# Patient Record
Sex: MALE | Race: WHITE | Employment: FULL TIME | URBAN - NONMETROPOLITAN AREA
[De-identification: names, ages, dates, MRNs, and addresses within clinical notes are randomized per-mention and may not be internally consistent; named-entity substitution may affect disease eponyms.]

---

## 2022-07-07 ENCOUNTER — APPOINTMENT (OUTPATIENT)
Dept: PHYSICAL THERAPY | Age: 52
End: 2022-07-07
Payer: COMMERCIAL

## 2022-07-11 ENCOUNTER — HOSPITAL ENCOUNTER (OUTPATIENT)
Dept: PHYSICAL THERAPY | Age: 52
Discharge: HOME | End: 2022-07-11
Payer: COMMERCIAL

## 2022-07-11 PROCEDURE — 97161 PT EVAL LOW COMPLEX 20 MIN: CPT

## 2022-07-11 NOTE — THERAPY EVALUATION
[address] AND [facility], [address]  Ph: [phone]    Fax: [phone]    Plan of Care/Statement of Necessity for Physical Therapy Services  2-15    Patient name: Natalie Rogers  : 1970  Provider#: [id number]  [facility], Not On File, MD      Medical/Treatment Diagnosis: Chronic low back pain with sciatica, sciatica laterality unspecified, unspecified back pain laterality [M54.40, G89.29]     Prior Hospitalization: see medical history     Comorbidities: see medical history  Prior Level of Function: Independent with all ADL's; no use of AD  Medications: Verified on Patient Summary List    Start of Care: 2022      Onset Date: 2022       The Plan of Care and following information is based on the information from the initial evaluation. Assessment/ key information:   Patient presents to physical therapy with complaints of lower back pain that radiates into right LE, decreased ROM, right le numbness/tingling down into lateral and plantar aspect of right foot, and lower extremity weakness limiting functional activities. The patient would benefit from physical therapy to utilize modalities to decrease pain, increase ROM, strength, and core stability to maximize function. Pt will receive treatment including lumbopelvic and lower extremity flexibility, ROM, strengthening, pelvic and core stabilization, functional activities, and education in posture and body mechanics. Patient's symptoms are consistent with likely nerve compression in piriformis region at the sciatic nerve. Objective measurements rule out likelihood of nerve compression in lumbar region. DPT to monitor pt's progress with conservative physical therapy treatment, and may refer to MD for further MRI diagnostic testing if symptoms do not improve. Pt with scheduled appt with neurologist 2022.   Patient will be instructed in HEP with 1:1 supervision and given pictures to facilitate compliance. Patient would benefit from skilled physical therapy to progress toward goals and maximize function. This plan of care was discussed with the physical therapist assistant. Thank you for this referral.       Evaluation Complexity History LOW Complexity : Zero comorbidities / personal factors that will impact the outcome / POC; Examination LOW Complexity : 1-2 Standardized tests and measures addressing body structure, function, activity limitation and / or participation in recreation  ;Presentation LOW Complexity : Stable, uncomplicated  ;Clinical Decision Making   Overall Complexity Rating: LOW     Problem List: pain affecting function, decrease ROM, decrease strength, impaired gait/ balance, decrease ADL/ functional abilitiies and decrease activity tolerance   Treatment Plan may include any combination of the following: Therapeutic exercise, Therapeutic activities, Neuromuscular re-education, Physical agent/modality, Gait/balance training, Manual therapy, Patient education, Self Care training, Functional mobility training, Home safety training and Stair training  Patient / Family readiness to learn indicated by: asking questions, trying to perform skills and interest  Persons(s) to be included in education: patient (P)  Barriers to Learning/Limitations: None  Patient Goal (s): 100% back to work  Patient Self Reported Health Status: good  Rehabilitation Potential: good    Short Term Goals: To be accomplished in 5 treatments. 1. Patient will demonstrate independence and compliance with HEP in order to assist with carryover from PT services. 2.   Patient will be able to yvon/doff socks/shoes without pain greater than or equal to 2/10 to increase functional mobility. 3.   Patient will be able to walk around at work without pain greater than or equal to 2/10 to increase functional mobility.   4.   Patient will be able to sleep at night without pain greater than or equal to 2/10 to increase functional mobility. 5.   Patient will increase right knee extension PROM to -15 in order to ambulate with more normalized gait pattern. Long Term Goals: To be accomplished in 10  treatments. 1.   Patient will be able to sleep at night without pain greater than or equal to 1/10 to increase functional mobility. 2.   Patient will increase right knee extension PROM to -10 in order to ambulate with more normalized gait pattern. 3.   Patient will report centralization of numbness/tingling down left le, to decrease fear of falling during gait. Frequency / Duration: Patient to be seen 2 times per week for 10 treatments. Patient/ Caregiver education and instruction: exercises    [x]  Plan of care has been reviewed with KELECHI Oswald, PT , DPT     2022     ________________________________________________________________________    I certify that the above Therapy Services are being furnished while the patient is under my care. I agree with the treatment plan and certify that this therapy is necessary.     Physician's Signature:____________________  Date:____________Time: _________    Patient name: Sharan Serrano  : 1970  Provider#: [id number]
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
## 2022-07-13 ENCOUNTER — HOSPITAL ENCOUNTER (OUTPATIENT)
Dept: PHYSICAL THERAPY | Age: 52
Discharge: HOME | End: 2022-07-13
Payer: COMMERCIAL

## 2022-07-13 PROCEDURE — 97014 ELECTRIC STIMULATION THERAPY: CPT

## 2022-07-13 PROCEDURE — 97110 THERAPEUTIC EXERCISES: CPT

## 2022-07-13 NOTE — PROGRESS NOTES
PT DAILY TREATMENT NOTE 2-15    Patient Name: Wilda Frankel  Date:2022  : 1970  [x]  Patient  Verified  Payor: Arslan Alicea / Plan: Esther Kumar PPO / Product Type: PPO /    In time: 8:25  Out time: 9:30  Total Treatment Time (min): 65  Visit #:  2    Treatment Area: Chronic low back pain with sciatica, sciatica laterality unspecified, unspecified back pain laterality [M54.40, G89.29]    SUBJECTIVE  Pain Level (0-10 scale): 5/10  Any medication changes, allergies to medications, adverse drug reactions, diagnosis change, or new procedure performed?: [x] No    [] Yes (see summary sheet for update)  Subjective functional status/changes:     Pt states the numbness in his leg is getting better.     OBJECTIVE    Modality rationale: decrease pain and increase tissue extensibility to improve the patients ability to be able to perform AROM   Min Type Additional Details      10 [x] Estim: []Att   [x]Unatt    []TENS instruct                  [x]IFC  []Premod   []NMES                    []Other:  []w/US      [x]w/ heat  []w/ ice  Position: seated  Location: R piriformis       []  Traction: [] Cervical       []Lumbar                       [] Prone          []Supine                       []Intermittent   []Continuous Lbs:  [] before manual  [] after manual  [] w/ heat  [] Simultaneously performed with w/ Estim    []  Ultrasound: []Continuous   [] Pulsed                       at: []1MHz   []3MHz Location:  W/cm2:    [] Paraffin         Location:   []w/heat    []  Ice     []  Heat  []  Ice massage Position:  Location:    []  Laser  []  Other: Position:  Location:      []  Vasopneumatic Device Pressure:       [] lo [] med [] hi   [] w/ ice      Temperature:   [] Simultaneously performed with w/ Estim     [x] Skin assessment post-treatment:  [x]intact [x]redness- no adverse reaction    []redness - adverse reaction:       55 min Therapeutic Exercise:  [x] See flow sheet :   Rationale: increase ROM and increase strength to improve the patients ability to improve functional mobility         With   [x] TE   [] TA   [] neuro   [] other: Patient Education: [x] Review HEP    [] Progressed/Changed HEP based on:   [] positioning   [] body mechanics   [] transfers   [] heat/ice application    [] other:      Other Objective/Functional Measures: Initiated ROM and strengthening     Pain Level (0-10 scale) post treatment: 5/10    ASSESSMENT/Changes in Function: Patient tolerated treatment fairly well. Reviewed ang gave pt handout of HEP with copy placed in chart. Most c/o was supine HS stretch. Patient will continue to benefit from skilled PT services to address functional mobility deficits, address ROM deficits and address strength deficits to attain remaining goals. [x]  See Plan of Care  []  See progress note/recertification  []  See Discharge Summary         Progress towards goals / Updated goals:  Short Term Goals: To be accomplished in 5 treatments. 1. Patient will demonstrate independence and compliance with HEP in order to assist with carryover from PT services. 2.   Patient will be able to yvon/doff socks/shoes without pain greater than or equal to 2/10 to increase functional mobility. 3.   Patient will be able to walk around at work without pain greater than or equal to 2/10 to increase functional mobility. 4.   Patient will be able to sleep at night without pain greater than or equal to 2/10 to increase functional mobility. 5.   Patient will increase right knee extension PROM to -15 in order to ambulate with more normalized gait pattern.     Long Term Goals: To be accomplished in 10  treatments. 1.   Patient will be able to sleep at night without pain greater than or equal to 1/10 to increase functional mobility. 2.   Patient will increase right knee extension PROM to -10 in order to ambulate with more normalized gait pattern.   3.   Patient will report centralization of numbness/tingling down left le, to decrease fear of falling during gait.      PLAN  [x]  Upgrade activities as tolerated     [x]  Continue plan of care  []  Update interventions per flow sheet       []  Discharge due to:_  []  Other:_      Aide Hammond PTA, VALERIE 7/13/2022

## 2022-07-18 ENCOUNTER — HOSPITAL ENCOUNTER (OUTPATIENT)
Dept: PHYSICAL THERAPY | Age: 52
Discharge: HOME | End: 2022-07-18
Payer: COMMERCIAL

## 2022-07-18 PROCEDURE — 97110 THERAPEUTIC EXERCISES: CPT

## 2022-07-18 PROCEDURE — 97014 ELECTRIC STIMULATION THERAPY: CPT

## 2022-07-18 NOTE — PROGRESS NOTES
PT DAILY TREATMENT NOTE 2-15    Patient Name: Milana Trejo  Date:2022  : 1970  [x]  Patient  Verified  Payor: Rian Armenta / Plan: Martha Valderrama PPO / Product Type: PPO /    In time:9:32  Out time:10:30  Total Treatment Time (min): 62  Visit #:  3    Treatment Area: Chronic low back pain with sciatica, sciatica laterality unspecified, unspecified back pain laterality [M54.40, G89.29]    SUBJECTIVE  Pain Level (0-10 scale): 210  Any medication changes, allergies to medications, adverse drug reactions, diagnosis change, or new procedure performed?: [x] No    [] Yes (see summary sheet for update)  Subjective functional status/changes: \"Im hurting a little bit today, I think its getting worse. I think Im in a little bit of pain from working all week but I took it easy over the weekend. It didn't really start hurting again until yesterday. I was surprised because I didn't have pain through the work week.  I am going to see the neurologist today, so I hope they can help me out al\"    OBJECTIVE      Modality rationale: decrease pain and increase tissue extensibility to improve the patients ability to perform ADL's following treatmetn   Min Type Additional Details    10 [x] Estim: []Att   []Unatt    []TENS instruct                  []IFC  []Premod   []NMES                    []Other:  []w/US      [x]w/ heat  []w/ ice  Position: seated  Location: R hip       []  Traction: [] Cervical       []Lumbar                       [] Prone          []Supine                       []Intermittent   []Continuous Lbs:  [] before manual  [] after manual  [] w/ heat  [] Simultaneously performed with w/ Estim    []  Ultrasound: []Continuous   [] Pulsed                       at: []1MHz   []3MHz Location:  W/cm2:    [] Paraffin         Location:   []w/heat    []  Ice     []  Heat  []  Ice massage Position:  Location:    []  Laser  []  Other: Position:  Location:      []  Vasopneumatic Device Pressure:       [] lo [] med [] hi   [] w/ ice      Temperature:   [] Simultaneously performed with w/ Estim     [x] Skin assessment post-treatment:  [x]intact []redness- no adverse reaction    []redness - adverse reaction:       48 min Therapeutic Exercise:  [x] See flow sheet :   Rationale: increase ROM, increase strength and improve balance to improve the patients ability to return to performing work related tasks like swinging a hammer      With   [x] TE   [] TA   [] neuro   [] other: Patient Education: [x] Review HEP    [] Progressed/Changed HEP based on:   [] positioning   [x] body mechanics   [] transfers   [x] heat/ice application    [] other:        Pain Level (0-10 scale) post treatment: 1/10    ASSESSMENT/Changes in Function:   Patient tolerated treatment well. Pt reports increase in symptoms from last treatment so we did not progress exercise program. Pt was able to complete all exercises today with minimal increases in pain in R hip and required minimal cuing for exercise technique. Patient will continue to benefit from skilled PT services to modify and progress therapeutic interventions, address functional mobility deficits, address ROM deficits, address strength deficits, analyze and address soft tissue restrictions, analyze and cue movement patterns, analyze and modify body mechanics/ergonomics and assess and modify postural abnormalities to attain remaining goals. [x]  See Plan of Care  []  See progress note/recertification  []  See Discharge Summary         Progress towards goals / Updated goals:  Short Term Goals: To be accomplished in 5 treatments. 1. Patient will demonstrate independence and compliance with HEP in order to assist with carryover from PT services. 2.   Patient will be able to yvon/doff socks/shoes without pain greater than or equal to 2/10 to increase functional mobility. 3.   Patient will be able to walk around at work without pain greater than or equal to 2/10 to increase functional mobility.   4.   Patient will be able to sleep at night without pain greater than or equal to 2/10 to increase functional mobility. 5.   Patient will increase right knee extension PROM to -15 in order to ambulate with more normalized gait pattern.     Long Term Goals: To be accomplished in 10  treatments. 1.   Patient will be able to sleep at night without pain greater than or equal to 1/10 to increase functional mobility. 2.   Patient will increase right knee extension PROM to -10 in order to ambulate with more normalized gait pattern.   3.   Patient will report centralization of numbness/tingling down left le, to decrease fear of falling during gait.        PLAN  [x]  Upgrade activities as tolerated     [x]  Continue plan of care  []  Update interventions per flow sheet       []  Discharge due to:_  []  Other:_      ALINE Green,  7/18/2022

## 2022-07-20 ENCOUNTER — HOSPITAL ENCOUNTER (OUTPATIENT)
Dept: PHYSICAL THERAPY | Age: 52
Discharge: HOME | End: 2022-07-20
Payer: COMMERCIAL

## 2022-07-20 PROCEDURE — 97014 ELECTRIC STIMULATION THERAPY: CPT

## 2022-07-20 PROCEDURE — 97110 THERAPEUTIC EXERCISES: CPT

## 2022-07-20 NOTE — PROGRESS NOTES
PT DAILY TREATMENT NOTE 2-15    Patient Name: Merced Bethea  Date:2022  : 1970  [x]  Patient  Verified  Payor: Israel Grider / Plan: Jarrett Nichols PPO / Product Type: PPO /    In time: 9:29  Out time: 10:33  Total Treatment Time (min): 64  Visit #:  4    Treatment Area: Chronic low back pain with sciatica, sciatica laterality unspecified, unspecified back pain laterality [M54.40, G89.29]    SUBJECTIVE  Pain Level (0-10 scale): 0/10  Any medication changes, allergies to medications, adverse drug reactions, diagnosis change, or new procedure performed?: [x] No    [] Yes (see summary sheet for update)  Subjective functional status/changes:     \"Just stiff; pain is gone. \"    OBJECTIVE    Modality rationale: decrease pain and increase tissue extensibility to improve the patients ability to be able to perform AROM   Min Type Additional Details      10 [x] Estim: []Att   [x]Unatt    []TENS instruct                  [x]IFC  []Premod   []NMES                    []Other:  []w/US      [x]w/ heat  []w/ ice  Position: seated  Location: R piriformis       []  Traction: [] Cervical       []Lumbar                       [] Prone          []Supine                       []Intermittent   []Continuous Lbs:  [] before manual  [] after manual  [] w/ heat  [] Simultaneously performed with w/ Estim    []  Ultrasound: []Continuous   [] Pulsed                       at: []1MHz   []3MHz Location:  W/cm2:    [] Paraffin         Location:   []w/heat    []  Ice     []  Heat  []  Ice massage Position:  Location:    []  Laser  []  Other: Position:  Location:      []  Vasopneumatic Device Pressure:       [] lo [] med [] hi   [] w/ ice      Temperature:   [] Simultaneously performed with w/ Estim     [x] Skin assessment post-treatment:  [x]intact [x]redness- no adverse reaction    []redness - adverse reaction:       54 min Therapeutic Exercise:  [x] See flow sheet :   Rationale: increase ROM and increase strength to improve the patients ability to improve functional mobility          With   [x] TE   [] TA   [] neuro   [] other: Patient Education: [x] Review HEP    [] Progressed/Changed HEP based on:   [] positioning   [] body mechanics   [] transfers   [] heat/ice application    [] other:      Pain Level (0-10 scale) post treatment: 0/10    ASSESSMENT/Changes in Function: Patient tolerated treatment fairly well. Continued focus on stretching at R piriformis area. No c/o increased pain today. Patient will continue to benefit from skilled PT services to address functional mobility deficits, address ROM deficits, and address strength deficits to attain remaining goals. [x]  See Plan of Care  []  See progress note/recertification  []  See Discharge Summary         Progress towards goals / Updated goals:  Short Term Goals: To be accomplished in 5 treatments. Patient will demonstrate independence and compliance with HEP in order to assist with carryover from PT services. 2.   Patient will be able to yvon/doff socks/shoes without pain greater than or equal to 2/10 to increase functional mobility. 3.   Patient will be able to walk around at work without pain greater than or equal to 2/10 to increase functional mobility. 4.   Patient will be able to sleep at night without pain greater than or equal to 2/10 to increase functional mobility. 5.   Patient will increase right knee extension PROM to -15 in order to ambulate with more normalized gait pattern. Long Term Goals: To be accomplished in 10  treatments. 1.   Patient will be able to sleep at night without pain greater than or equal to 1/10 to increase functional mobility. 2.   Patient will increase right knee extension PROM to -10 in order to ambulate with more normalized gait pattern. 3.   Patient will report centralization of numbness/tingling down left le, to decrease fear of falling during gait.     PLAN  [x]  Upgrade activities as tolerated     [x]  Continue plan of care  []  Update interventions per flow sheet       []  Discharge due to:_  []  Other:_      Quincy Saini PTA, LPTA 7/20/2022

## 2022-07-26 ENCOUNTER — HOSPITAL ENCOUNTER (OUTPATIENT)
Dept: PHYSICAL THERAPY | Age: 52
Discharge: HOME | End: 2022-07-26
Payer: COMMERCIAL

## 2022-07-26 PROCEDURE — 97110 THERAPEUTIC EXERCISES: CPT

## 2022-07-26 PROCEDURE — 97014 ELECTRIC STIMULATION THERAPY: CPT

## 2022-07-26 NOTE — PROGRESS NOTES
PT DAILY TREATMENT NOTE 2-15    Patient Name: Earlyne Fleischer  Date:2022  : 1970  [x]  Patient  Verified  Payor: Adriana Nichols / Plan: Aurelia Beckett PPO / Product Type: PPO /    In time:7:16  Out time:8:20  Total Treatment Time (min): 64  Visit #:  5    Treatment Area: Chronic low back pain with sciatica, sciatica laterality unspecified, unspecified back pain laterality [M54.40, G89.29]    SUBJECTIVE  Pain Level (0-10 scale): 0/10  Any medication changes, allergies to medications, adverse drug reactions, diagnosis change, or new procedure performed?: [x] No    [] Yes (see summary sheet for update)  Subjective functional status/changes:     \"I am doing pretty good today. Just stiff. \"    OBJECTIVE    Modality rationale: decrease edema, decrease inflammation, and decrease pain to improve the patients ability to tolerate functional movements.     Min Type Additional Details      10 [x] Estim: []Att   [x]Unatt    []TENS instruct                  [x]IFC  []Premod   []NMES                    []Other:  []w/US      [x]w/ heat  []w/ ice  Position: lower back  Location: seated       []  Traction: [] Cervical       []Lumbar                       [] Prone          []Supine                       []Intermittent   []Continuous Lbs:  [] before manual  [] after manual  [] w/ heat  [] Simultaneously performed with w/ Estim    []  Ultrasound: []Continuous   [] Pulsed                       at: []1MHz   []3MHz Location:  W/cm2:    [] Paraffin         Location:   []w/heat    []  Ice     []  Heat  []  Ice massage Position:  Location:    []  Laser  []  Other: Position:  Location:      []  Vasopneumatic Device Pressure:       [] lo [] med [] hi   [] w/ ice      Temperature:   [] Simultaneously performed with w/ Estim     [x] Skin assessment post-treatment:  [x]intact [x]redness- no adverse reaction    []redness - adverse reaction:       54 min Therapeutic Exercise:  [x] See flow sheet :   Rationale: increase ROM, increase strength, improve coordination, and improve balance to improve the patients ability to ambulate with less difficulty. With   [x] TE   [] TA   [] neuro   [] other: Patient Education: [x] Review HEP    [] Progressed/Changed HEP based on:   [] positioning   [] body mechanics   [] transfers   [] heat/ice application    [] other:      Pain Level (0-10 scale) post treatment: 0/10    ASSESSMENT/Changes in Function:   Patient tolerated treatment very well today. He is not having as much pain currently, but still feels super stiff. His hamstrings are very tight still and may benefit from some contract relax stretching. Patient will continue to benefit from skilled PT services to modify and progress therapeutic interventions, address functional mobility deficits, address ROM deficits, address strength deficits, and analyze and address soft tissue restrictions to attain remaining goals. [x]  See Plan of Care  []  See progress note/recertification  []  See Discharge Summary         Progress towards goals / Updated goals:  Short Term Goals: To be accomplished in 5 treatments. Patient will demonstrate independence and compliance with HEP in order to assist with carryover from PT services. MET  2. Patient will be able to yvon/doff socks/shoes without pain greater than or equal to 2/10 to increase functional mobility. 3.   Patient will be able to walk around at work without pain greater than or equal to 2/10 to increase functional mobility. 4.   Patient will be able to sleep at night without pain greater than or equal to 2/10 to increase functional mobility. 5.   Patient will increase right knee extension PROM to -15 in order to ambulate with more normalized gait pattern. Long Term Goals: To be accomplished in 10  treatments. 1.   Patient will be able to sleep at night without pain greater than or equal to 1/10 to increase functional mobility.   2.   Patient will increase right knee extension PROM to -10 in order to ambulate with more normalized gait pattern. 3.   Patient will report centralization of numbness/tingling down left le, to decrease fear of falling during gait.     PLAN  [x]  Upgrade activities as tolerated     [x]  Continue plan of care  []  Update interventions per flow sheet       []  Discharge due to:_  []  Other:_      Mariaelena Reynolds, PT, DPT 7/26/2022

## 2022-07-28 ENCOUNTER — HOSPITAL ENCOUNTER (OUTPATIENT)
Dept: PHYSICAL THERAPY | Age: 52
Discharge: HOME | End: 2022-07-28
Payer: COMMERCIAL

## 2022-07-28 PROCEDURE — 97014 ELECTRIC STIMULATION THERAPY: CPT

## 2022-07-28 PROCEDURE — 97110 THERAPEUTIC EXERCISES: CPT

## 2022-07-28 NOTE — PROGRESS NOTES
PT DAILY TREATMENT NOTE 2-15    Patient Name: Vicky Orr  Date:2022  : 1970  [x]  Patient  Verified  Payor: Nisreen Cassidy / Plan: Leah Andujar PPO / Product Type: PPO /    In time:9:32  Out time:10:28  Total Treatment Time (min): 64  Visit #:  6    Treatment Area: Chronic low back pain with sciatica, sciatica laterality unspecified, unspecified back pain laterality [M54.40, G89.29]    SUBJECTIVE  Pain Level (0-10 scale): 0/10  Any medication changes, allergies to medications, adverse drug reactions, diagnosis change, or new procedure performed?: [x] No    [] Yes (see summary sheet for update)  Subjective functional status/changes: \"My back has that catch in it again. \"    OBJECTIVE      Modality rationale: decrease inflammation, decrease pain, and increase tissue extensibility to improve the patients ability to perform tasks with less tissue irritations.     Min Type Additional Details      10 [x] Estim: []Att   [x]Unatt    []TENS instruct                  [x]IFC  []Premod   []NMES                    []Other:  []w/US      [x]w/ heat  []w/ ice  Position: seated  Location: R glute/lower back       []  Traction: [] Cervical       []Lumbar                       [] Prone          []Supine                       []Intermittent   []Continuous Lbs:  [] before manual  [] after manual  [] w/ heat  [] Simultaneously performed with w/ Estim    []  Ultrasound: []Continuous   [] Pulsed                       at: []1MHz   []3MHz Location:  W/cm2:    [] Paraffin         Location:   []w/heat    []  Ice     []  Heat  []  Ice massage Position:  Location:    []  Laser  []  Other: Position:  Location:      []  Vasopneumatic Device Pressure:       [] lo [] med [] hi   [] w/ ice      Temperature:   [] Simultaneously performed with w/ Estim     [x] Skin assessment post-treatment:  [x]intact [x]redness- no adverse reaction    []redness - adverse reaction:       44 min Therapeutic Exercise:  [x] See flow sheet :   Rationale: increase ROM, increase strength, and improve coordination to improve the patients ability to perform daily tasks with less back spasms. With   [x] TE   [] TA   [] neuro   [] other: Patient Education: [x] Review HEP    [] Progressed/Changed HEP based on:   [] positioning   [] body mechanics   [] transfers   [] heat/ice application    [] other:      Pain Level (0-10 scale) post treatment: 0/10    ASSESSMENT/Changes in Function:   Patient tolerated treatment very well today. He presented with some catching in his back today that was relieved with stretches. We did the heat and estim on his lower back where he was experiencing the catching which helped well. We will plan to add in some more lumbar stretches as well in upcoming sessions. Patient will continue to benefit from skilled PT services to modify and progress therapeutic interventions, address functional mobility deficits, address ROM deficits, address strength deficits, analyze and address soft tissue restrictions, and analyze and cue movement patterns to attain remaining goals. [x]  See Plan of Care  []  See progress note/recertification  []  See Discharge Summary         Progress towards goals / Updated goals:  Short Term Goals: To be accomplished in 5 treatments. Patient will demonstrate independence and compliance with HEP in order to assist with carryover from PT services. MET  2. Patient will be able to yvon/doff socks/shoes without pain greater than or equal to 2/10 to increase functional mobility. 3.   Patient will be able to walk around at work without pain greater than or equal to 2/10 to increase functional mobility. 4.   Patient will be able to sleep at night without pain greater than or equal to 2/10 to increase functional mobility. 5.   Patient will increase right knee extension PROM to -15 in order to ambulate with more normalized gait pattern. Long Term Goals: To be accomplished in 10  treatments.   1.   Patient will be able to sleep at night without pain greater than or equal to 1/10 to increase functional mobility. 2.   Patient will increase right knee extension PROM to -10 in order to ambulate with more normalized gait pattern. 3.   Patient will report centralization of numbness/tingling down left le, to decrease fear of falling during gait.     PLAN  [x]  Upgrade activities as tolerated     [x]  Continue plan of care  []  Update interventions per flow sheet       []  Discharge due to:_  []  Other:_      Peter Frost PT, DPT 7/28/2022

## 2022-08-01 ENCOUNTER — HOSPITAL ENCOUNTER (OUTPATIENT)
Dept: PHYSICAL THERAPY | Age: 52
Discharge: HOME | End: 2022-08-01
Payer: COMMERCIAL

## 2022-08-01 PROCEDURE — 97110 THERAPEUTIC EXERCISES: CPT

## 2022-08-01 NOTE — PROGRESS NOTES
PT DAILY TREATMENT NOTE 2-15    Patient Name: Kamron Isaac  Date:2022  : 1970  [x]  Patient  Verified  Payor: Silvana Doll / Plan: Yoan Aleman PPO / Product Type: PPO /    In time:8:31  Out time:9:11  Total Treatment Time (min): 40  Visit #:  7    Treatment Area: Chronic low back pain with sciatica, sciatica laterality unspecified, unspecified back pain laterality [M54.40, G89.29]    SUBJECTIVE  Pain Level (0-10 scale): 0/10  Any medication changes, allergies to medications, adverse drug reactions, diagnosis change, or new procedure performed?: [x] No    [] Yes (see summary sheet for update)  Subjective functional status/changes:     \"I am feeling pretty good today. No pain today, but this weekend I had a little twinge and put some heat on it and it felt better. \"    OBJECTIVE  Posture:  fair  Other Observations:    Gait and Functional Mobility:  Full WB B, normalized gait pattern    Palpation: No tenderness with deep palpation to right piriformis region; no tenderness in lumbar spine, paraspinals, SI joint region, along B IT band  Sensation: LT intact and symmetrical                                                      Lumbar AROM:                                               Flexion                                              62 degrees                                               Extension                                         34 degrees                                                                                                      R                                 L  Side Bending                           30 degrees                  30 degrees                    Rotation                                   50 degrees                  55 degrees                       Manual Muscle Testing  Hip Flexion                   5/5                  5/5  Knee Extension           5/5                    5/5  Knee Flexion               5/5                   5/5  Ankle PF                     5/5 5/5  Ankle DF                     5/5                   5/5     Knee extension:  Knee ext: 0 degrees AROM B. Knee flexion WNL B  Flexibility: Improved hip flexibility B.        40 min Therapeutic Exercise:  [x] See flow sheet :   Rationale: increase ROM and increase strength to improve the patients ability to perform ADL's               With   [x] TE   [] TA   [] neuro   [] other: Patient Education: [x] Review HEP    [] Progressed/Changed HEP based on:   [] positioning   [x] body mechanics   [] transfers   [x] heat/ice application    [] other:         Pain Level (0-10 scale) post treatment: 0/10    ASSESSMENT/Changes in Function:   The pt has attended 7 skilled physical therapy sessions to date. He initially entered the clinic with complaints of Low back pain and sciatica, and presented with decreased lumbar and LE ROM and strength. Since his initial evaluation he has made improvements in ROM and strength, has decreased his pain level, centralized radicular symptoms, and met all stated goals. However, he continues to have occasional symptom flair ups that he has demonstrated he can manage with HEP, heat application, and rest. His Radicular symptoms have centralized and are manageable, but symptoms persists in R hip. The Pt has good understanding of how to manage his symptoms and feels he is ready to discontinue PT as long as he continues with HEP at home. Due to these reasons the pt is ready for discharge today. []  See Plan of Care  []  See progress note/recertification  [x]  See Discharge Summary         Progress towards goals / Updated goals:  Patient will demonstrate independence and compliance with HEP in order to assist with carryover from PT services. MET  2. Patient will be able to yvon/doff socks/shoes without pain greater than or equal to 2/10 to increase functional mobility. MET  3.    Patient will be able to walk around at work without pain greater than or equal to 2/10 to increase functional mobility. MET  4. Patient will be able to sleep at night without pain greater than or equal to 2/10 to increase functional mobility. MET  5. Patient will increase right knee extension PROM to -15 in order to ambulate with more normalized gait pattern. MET     Long Term Goals: To be accomplished in 10  treatments. 1.   Patient will be able to sleep at night without pain greater than or equal to 1/10 to increase functional mobility. MET  2. Patient will increase right knee extension PROM to -10 in order to ambulate with more normalized gait pattern. MET  3. Patient will report centralization of numbness/tingling down left le, to decrease fear of falling during gait. MET    PLAN  []  Upgrade activities as tolerated     []  Continue plan of care  []  Update interventions per flow sheet       [x]  Discharge due to:_All goals met   []  Other:_      Melanie ALINE Frye,  8/1/2022  .

## 2022-08-01 NOTE — THERAPY DISCHARGE
[address]  [place], [place]  Ph: [phone]    Fax: [phone]    Discharge Summary  2-15    Patient name: Abdi Robertson  : 1970  Provider#: [id number]  Referral source: Philipp Catalan MD      Medical/Treatment Diagnosis: Chronic low back pain with sciatica, sciatica laterality unspecified, unspecified back pain laterality [M54.40, G89.29]     Prior Hospitalization: see medical history     Comorbidities: See Plan of Care  Prior Level of Function:See Plan of Care  Medications: Verified on Patient Summary List    Start of Care: 21      Onset Date:2022   Visits from Start of Care: 7 Missed Visits: 0  Reporting Period : 2022 to 2022      ASSESSMENT/SUMMARY OF CARE:   The pt has attended 7 skilled physical therapy sessions to date. He initially entered the clinic with complaints of Low back pain and sciatica, and presented with decreased lumbar and LE ROM and strength. Since his initial evaluation he has made improvements in ROM and strength, has decreased his pain level, centralized radicular symptoms, and met all stated goals. However, he continues to have occasional symptom flair ups that he has demonstrated he can manage with HEP, heat application, and rest. His Radicular symptoms have centralized and are manageable, but symptoms persists in R hip. The Pt has good understanding of how to manage his symptoms and feels he is ready to discontinue PT as long as he continues with HEP at home. Due to these reasons the pt is ready for discharge today. Progress towards goals / Updated goals:  Patient will demonstrate independence and compliance with HEP in order to assist with carryover from PT services. MET  2. Patient will be able to yvon/doff socks/shoes without pain greater than or equal to 2/10 to increase functional mobility. MET  3.    Patient will be able to walk around at work without pain greater than or equal to 2/10 to increase functional mobility. MET  4. Patient will be able to sleep at night without pain greater than or equal to 2/10 to increase functional mobility. MET  5. Patient will increase right knee extension PROM to -15 in order to ambulate with more normalized gait pattern. MET     Long Term Goals: To be accomplished in 10  treatments. 1.   Patient will be able to sleep at night without pain greater than or equal to 1/10 to increase functional mobility. MET  2. Patient will increase right knee extension PROM to -10 in order to ambulate with more normalized gait pattern. MET  3. Patient will report centralization of numbness/tingling down left le, to decrease fear of falling during gait.  MET      RECOMMENDATIONS:  [x]Discontinue therapy: [x]Patient has reached or is progressing toward set goals      []Patient is non-compliant or has abdicated      []Due to lack of appreciable progress towards set goals      []Peña Jefferson, SPT,  8/1/2022

## 2022-08-03 ENCOUNTER — APPOINTMENT (OUTPATIENT)
Dept: PHYSICAL THERAPY | Age: 52
End: 2022-08-03
Payer: COMMERCIAL